# Patient Record
Sex: FEMALE | Race: WHITE | Employment: FULL TIME | URBAN - METROPOLITAN AREA
[De-identification: names, ages, dates, MRNs, and addresses within clinical notes are randomized per-mention and may not be internally consistent; named-entity substitution may affect disease eponyms.]

---

## 2023-09-12 ENCOUNTER — OFFICE VISIT (OUTPATIENT)
Dept: OBGYN CLINIC | Age: 38
End: 2023-09-12
Payer: COMMERCIAL

## 2023-09-12 VITALS
BODY MASS INDEX: 30.26 KG/M2 | DIASTOLIC BLOOD PRESSURE: 72 MMHG | SYSTOLIC BLOOD PRESSURE: 108 MMHG | HEIGHT: 70 IN | WEIGHT: 211.4 LBS

## 2023-09-12 DIAGNOSIS — Z80.9 FAMILY HISTORY OF CANCER: ICD-10-CM

## 2023-09-12 DIAGNOSIS — D68.51 FACTOR V LEIDEN (HCC): ICD-10-CM

## 2023-09-12 DIAGNOSIS — R87.810 ASCUS WITH POSITIVE HIGH RISK HPV CERVICAL: Primary | ICD-10-CM

## 2023-09-12 DIAGNOSIS — R87.610 ASCUS WITH POSITIVE HIGH RISK HPV CERVICAL: Primary | ICD-10-CM

## 2023-09-12 DIAGNOSIS — Z01.812 PRE-PROCEDURE LAB EXAM: ICD-10-CM

## 2023-09-12 DIAGNOSIS — N92.6 IRREGULAR MENSES: ICD-10-CM

## 2023-09-12 LAB
HCG, PREGNANCY, URINE, POC: NEGATIVE
VALID INTERNAL CONTROL, POC: YES

## 2023-09-12 PROCEDURE — 99203 OFFICE O/P NEW LOW 30 MIN: CPT | Performed by: OBSTETRICS & GYNECOLOGY

## 2023-09-12 PROCEDURE — 81025 URINE PREGNANCY TEST: CPT | Performed by: OBSTETRICS & GYNECOLOGY

## 2023-09-12 PROCEDURE — 57456 ENDOCERV CURETTAGE W/SCOPE: CPT | Performed by: OBSTETRICS & GYNECOLOGY

## 2023-09-12 RX ORDER — ESCITALOPRAM OXALATE 10 MG/1
10 TABLET ORAL DAILY
COMMUNITY

## 2023-09-12 RX ORDER — PHENTERMINE HYDROCHLORIDE 37.5 MG/1
37.5 CAPSULE ORAL EVERY MORNING
COMMUNITY

## 2023-09-12 RX ORDER — DIAZEPAM 5 MG/1
5 TABLET ORAL EVERY 6 HOURS PRN
COMMUNITY

## 2023-09-12 ASSESSMENT — PATIENT HEALTH QUESTIONNAIRE - PHQ9
SUM OF ALL RESPONSES TO PHQ QUESTIONS 1-9: 0
2. FEELING DOWN, DEPRESSED OR HOPELESS: 0
1. LITTLE INTEREST OR PLEASURE IN DOING THINGS: 0
SUM OF ALL RESPONSES TO PHQ9 QUESTIONS 1 & 2: 0
SUM OF ALL RESPONSES TO PHQ QUESTIONS 1-9: 0

## 2023-09-12 NOTE — PROGRESS NOTES
Manju Lin  is a 45 y.o. female, No obstetric history on file., Patient's last menstrual period was 08/28/2023 (within days). ,  who is seen for a new patient referral from [facility] for abnormal pap smear, ASCUS HPV + on 8/1/23. Pt states that she has had an abnormal pap smear in the past, approximately 16 years ago. She did not have any surgeries or procedures for the HPV + pap smear in the past.   She Havis an extensive medical history from when she was young and had multiple pulmonary emboli due to Leiden factor V. She ultimately had a vena caval filter placed. After this she did have an extensive period of time where she did not see a physician. She had had an abnormal Pap at her last postpartum visit approximately 16 years ago and has not had follow-up since then. In discussing things she has had somewhat irregular periods they are coming approximately monthly but some months will be very heavy other months very light. She is also very concerned and that her mother at age 52 was diagnosed with end-stage cancer of unknown primary source. And she is concerned screening evaluations including mammography. Discussed recommendations at a minimum would be mammography starting 10 years before the age of diagnosis of the family member which would be now. Discussed possibility if she has dense breast tissue of also annual breast MRIs although due to the fact we do not have a definitive diagnosis of her mother's cancer I am uncertain whether this would be necessary. Discussed the option of speaking to cancer genetic counselor to see if she would be a candidate for genetic cancer screenings.         HISTORY:  Sexual History:  single partner, contraception - tubal ligation  Contraception:  tubal ligation  Current Outpatient Medications on File Prior to Visit   Medication Sig Dispense Refill    escitalopram (LEXAPRO) 10 MG tablet Take 1 tablet by mouth daily      phentermine 37.5 MG capsule Take 1

## 2023-09-21 ENCOUNTER — OFFICE VISIT (OUTPATIENT)
Dept: OBGYN CLINIC | Age: 38
End: 2023-09-21

## 2023-09-21 VITALS — HEIGHT: 70 IN | BODY MASS INDEX: 30.33 KG/M2 | SYSTOLIC BLOOD PRESSURE: 120 MMHG | DIASTOLIC BLOOD PRESSURE: 80 MMHG

## 2023-09-21 DIAGNOSIS — R87.610 ASCUS WITH POSITIVE HIGH RISK HPV CERVICAL: ICD-10-CM

## 2023-09-21 DIAGNOSIS — D68.51 FACTOR V LEIDEN (HCC): ICD-10-CM

## 2023-09-21 DIAGNOSIS — Z80.9 FAMILY HISTORY OF CANCER: ICD-10-CM

## 2023-09-21 DIAGNOSIS — Z12.39 ENCOUNTER FOR SCREENING FOR MALIGNANT NEOPLASM OF BREAST, UNSPECIFIED SCREENING MODALITY: ICD-10-CM

## 2023-09-21 DIAGNOSIS — N92.6 IRREGULAR PERIODS: Primary | ICD-10-CM

## 2023-09-21 DIAGNOSIS — R87.810 ASCUS WITH POSITIVE HIGH RISK HPV CERVICAL: ICD-10-CM

## 2023-09-21 ASSESSMENT — PATIENT HEALTH QUESTIONNAIRE - PHQ9
SUM OF ALL RESPONSES TO PHQ QUESTIONS 1-9: 0
1. LITTLE INTEREST OR PLEASURE IN DOING THINGS: 0
SUM OF ALL RESPONSES TO PHQ QUESTIONS 1-9: 0
SUM OF ALL RESPONSES TO PHQ QUESTIONS 1-9: 0
SUM OF ALL RESPONSES TO PHQ9 QUESTIONS 1 & 2: 0
SUM OF ALL RESPONSES TO PHQ QUESTIONS 1-9: 0
2. FEELING DOWN, DEPRESSED OR HOPELESS: 0

## 2023-09-21 NOTE — PROGRESS NOTES
Esther Cabot  is a 45 y.o. female, [id number], Patient's last menstrual period was 08/28/2023 (within days). ,  who is seen for ultrasound for irregular periods  Ultrasound today shows:  Cx appears wnl  Uterus is anteverted and mildly heterogeneous  Endo=5.8mm, no intracavitary masses visualized   ROV visualized with follicles and appears wnl  LOV visualized with follicles and appears wnl  Bilateral adnexa appear wnl      HISTORY:    Current Outpatient Medications on File Prior to Visit   Medication Sig Dispense Refill    escitalopram (LEXAPRO) 10 MG tablet Take 1 tablet by mouth daily      phentermine 37.5 MG capsule Take 1 capsule by mouth every morning. diazePAM (VALIUM) 5 MG tablet Take 1 tablet by mouth every 6 hours as needed for Anxiety. No current facility-administered medications on file prior to visit. 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 .    Previous surgeries include  has a past surgical history that includes Tubal ligation (2007); bronchoscopy; Cholecystectomy; and other surgical history. .    Her current meds are   Current Outpatient Medications:     escitalopram (LEXAPRO) 10 MG tablet, Take 1 tablet by mouth daily, Disp: , Rfl:     phentermine 37.5 MG capsule, Take 1 capsule by mouth every morning., Disp: , Rfl:     diazePAM (VALIUM) 5 MG tablet, Take 1 tablet by mouth every 6 hours as needed for Anxiety. , Disp: , Rfl:      Family history is significant for family history includes Bleeding Prob in her daughter, father, and mother; Cancer in her mother. .       PHYSICAL EXAM:  Blood pressure 120/80, height 5' 10\" (1.778 m), last menstrual period 08/28/2023. OBGyn Exam   The patient appears well, alert, oriented x 3, in no distress. ASSESSMENT:  Encounter Diagnoses   Name Primary?     Irregular periods

## 2023-10-02 ENCOUNTER — TELEPHONE (OUTPATIENT)
Dept: OBGYN CLINIC | Age: 38
End: 2023-10-02

## 2023-10-02 NOTE — TELEPHONE ENCOUNTER
Spoke with the patient about the referral that was placed to genetic counselor, their office has been trying to reach her about scheduling an appointment. Patient said she would like to see the genetic counselor. Phone number given to patient to call to schedule appointment [phone].